# Patient Record
Sex: FEMALE
[De-identification: names, ages, dates, MRNs, and addresses within clinical notes are randomized per-mention and may not be internally consistent; named-entity substitution may affect disease eponyms.]

---

## 2017-08-28 ENCOUNTER — RX ONLY (RX ONLY)
Age: 22
End: 2017-08-28

## 2017-08-28 ENCOUNTER — PSORIASIS (OUTPATIENT)
Dept: URBAN - METROPOLITAN AREA CLINIC 31 | Facility: CLINIC | Age: 22
Setting detail: DERMATOLOGY
End: 2017-08-28

## 2017-08-28 PROBLEM — L40.0 PSORIASIS VULGARIS: Status: RESOLVED | Noted: 2017-08-28

## 2017-08-28 PROBLEM — L71.8 OTHER ROSACEA: Status: RESOLVED | Noted: 2017-08-28

## 2017-08-28 PROBLEM — L71.8 OTHER ROSACEA: Status: ACTIVE | Noted: 2017-08-28

## 2017-08-28 PROBLEM — L40.0 PSORIASIS VULGARIS: Status: ACTIVE | Noted: 2017-08-28

## 2017-08-28 PROCEDURE — 99202 OFFICE O/P NEW SF 15 MIN: CPT

## 2017-08-28 RX ORDER — CLOBETASOL PROPIONATE 0.5 MG/G
1 APPLICATION CREAM TOPICAL BID
Qty: 60 | Refills: 1
Start: 2017-08-28

## 2017-08-28 RX ORDER — KETOCONAZOLE 20 MG/ML
1 APPLICATION SHAMPOO, SUSPENSION TOPICAL 3 TIMES WEEKLY
Qty: 120 | Refills: 4 | Status: DISCONTINUED
Start: 2017-08-28 | End: 2018-12-27

## 2017-08-28 RX ORDER — CLOBETASOL PROPIONATE 0.5 MG/ML
1 ML SPRAY TOPICAL NIGHTLY
Qty: 125 | Refills: 3
Start: 2017-08-28

## 2017-11-21 ENCOUNTER — VBEAM/KTP-BH (OUTPATIENT)
Dept: URBAN - METROPOLITAN AREA CLINIC 31 | Facility: CLINIC | Age: 22
Setting detail: DERMATOLOGY
End: 2017-11-21

## 2017-11-21 PROCEDURE — VBEAM VBEAM LASER TREATMENT: HCPCS

## 2017-11-29 ENCOUNTER — FOLLOW UP (OUTPATIENT)
Dept: URBAN - METROPOLITAN AREA CLINIC 34 | Facility: CLINIC | Age: 22
Setting detail: DERMATOLOGY
End: 2017-11-29

## 2017-11-29 PROBLEM — L71.8 OTHER ROSACEA: Status: RESOLVED | Noted: 2017-11-29

## 2017-11-29 PROBLEM — L40.0 PSORIASIS VULGARIS: Status: RESOLVED | Noted: 2017-11-29

## 2017-11-29 PROBLEM — L40.0 PSORIASIS VULGARIS: Status: ACTIVE | Noted: 2017-11-29

## 2017-11-29 PROBLEM — L71.8 OTHER ROSACEA: Status: ACTIVE | Noted: 2017-11-29

## 2017-11-29 PROCEDURE — 99213 OFFICE O/P EST LOW 20 MIN: CPT

## 2018-12-27 ENCOUNTER — RX ONLY (RX ONLY)
Age: 23
End: 2018-12-27

## 2018-12-27 ENCOUNTER — ACNE/ROSACEA (OUTPATIENT)
Dept: URBAN - METROPOLITAN AREA CLINIC 32 | Facility: CLINIC | Age: 23
Setting detail: DERMATOLOGY
End: 2018-12-27

## 2018-12-27 PROBLEM — L40.0 PSORIASIS VULGARIS: Status: RESOLVED | Noted: 2018-12-27

## 2018-12-27 PROBLEM — L40.0 PSORIASIS VULGARIS: Status: ACTIVE | Noted: 2018-12-27

## 2018-12-27 PROCEDURE — 99213 OFFICE O/P EST LOW 20 MIN: CPT

## 2018-12-27 RX ORDER — CLINDAMYCIN PHOSPHATE 10 MG/G
1 APPLICATION GEL TOPICAL DAILY
Qty: 60 | Refills: 3
Start: 2018-12-27

## 2018-12-27 RX ORDER — DOXYCYCLINE 100 MG/1
1 TABLET TABLET, FILM COATED ORAL BID
Qty: 60 | Refills: 1
Start: 2018-12-27

## 2018-12-27 RX ORDER — FLUOCINONIDE 0.5 MG/ML
1 APPLICATION SOLUTION TOPICAL NIGHTLY
Qty: 60 | Refills: 3
Start: 2018-12-27

## 2018-12-27 RX ORDER — KETOCONAZOLE 20 MG/ML
1 APPLICATION SHAMPOO, SUSPENSION TOPICAL 3 TIMES WEEKLY
Qty: 120 | Refills: 4
Start: 2018-12-27

## 2018-12-28 ENCOUNTER — VBEAM/KTP-BH (OUTPATIENT)
Dept: URBAN - METROPOLITAN AREA CLINIC 31 | Facility: CLINIC | Age: 23
Setting detail: DERMATOLOGY
End: 2018-12-28

## 2018-12-28 PROCEDURE — VBEAM VBEAM LASER TREATMENT: HCPCS

## 2019-01-09 ENCOUNTER — RX ONLY (RX ONLY)
Age: 24
End: 2019-01-09

## 2019-01-09 RX ORDER — MUPIROCIN 20 MG/G
1 APPLICATION OINTMENT TOPICAL ADD'L SIG
Qty: 15 | Refills: 1
Start: 2019-01-09

## 2019-11-22 ENCOUNTER — VBEAM/KTP-BH (OUTPATIENT)
Dept: URBAN - METROPOLITAN AREA CLINIC 31 | Facility: CLINIC | Age: 24
Setting detail: DERMATOLOGY
End: 2019-11-22

## 2019-11-22 DIAGNOSIS — D22.5 MELANOCYTIC NEVI OF TRUNK: ICD-10-CM

## 2019-11-22 PROCEDURE — VBEAM VBEAM LASER TREATMENT: HCPCS

## 2019-12-20 ENCOUNTER — SKIN CANCER EXAM (OUTPATIENT)
Dept: URBAN - METROPOLITAN AREA CLINIC 31 | Facility: CLINIC | Age: 24
Setting detail: DERMATOLOGY
End: 2019-12-20

## 2019-12-20 DIAGNOSIS — L73.8 OTHER SPECIFIED FOLLICULAR DISORDERS: ICD-10-CM

## 2019-12-20 DIAGNOSIS — L30.9 DERMATITIS, UNSPECIFIED: ICD-10-CM

## 2019-12-20 DIAGNOSIS — D23.62 OTHER BENIGN NEOPLASM OF SKIN OF LEFT UPPER LIMB, INCLUDING SHOULDER: ICD-10-CM

## 2019-12-20 PROCEDURE — 99214 OFFICE O/P EST MOD 30 MIN: CPT

## 2021-11-29 ENCOUNTER — VBEAM/KTP-EC (OUTPATIENT)
Dept: URBAN - METROPOLITAN AREA CLINIC 32 | Facility: CLINIC | Age: 26
Setting detail: DERMATOLOGY
End: 2021-11-29

## 2021-11-29 DIAGNOSIS — L66.9 CICATRICIAL ALOPECIA, UNSPECIFIED: ICD-10-CM

## 2021-11-29 PROCEDURE — VBEAM VBEAM LASER TREATMENT: HCPCS

## 2024-12-19 ENCOUNTER — APPOINTMENT (OUTPATIENT)
Dept: URBAN - METROPOLITAN AREA CLINIC 208 | Age: 29
Setting detail: DERMATOLOGY
End: 2024-12-26

## 2024-12-19 DIAGNOSIS — Z41.9 ENCOUNTER FOR PROCEDURE FOR PURPOSES OTHER THAN REMEDYING HEALTH STATE, UNSPECIFIED: ICD-10-CM

## 2024-12-19 PROCEDURE — OTHER VBEAM PERFECTA LASER: OTHER

## 2024-12-19 ASSESSMENT — LOCATION SIMPLE DESCRIPTION DERM
LOCATION SIMPLE: NOSE
LOCATION SIMPLE: LEFT CHEEK
LOCATION SIMPLE: INFERIOR FOREHEAD
LOCATION SIMPLE: RIGHT CHEEK

## 2024-12-19 ASSESSMENT — LOCATION DETAILED DESCRIPTION DERM
LOCATION DETAILED: NASAL DORSUM
LOCATION DETAILED: INFERIOR MID FOREHEAD
LOCATION DETAILED: RIGHT INFERIOR CENTRAL MALAR CHEEK
LOCATION DETAILED: LEFT INFERIOR MEDIAL MALAR CHEEK

## 2024-12-19 ASSESSMENT — LOCATION ZONE DERM
LOCATION ZONE: NOSE
LOCATION ZONE: FACE

## 2024-12-19 NOTE — PROCEDURE: VBEAM PERFECTA LASER
Spray Time (Ms): 0
Spot Size: 5 mm
Spray Time (Ms): 30
Pulse Duration: 10 ms
Is There A Third Setting?: no
Post-Procedure: Following the procedure the post care instructions were reviewed with the patient.
Location: cheeks, forehead and nose
Delay Time (Ms): 20
Treatment Number: 1
Consent: Written consent obtained.  Risks were reviewed including but not limited to crusting, scabbing, blistering, scarring, darker or lighter pigmentary change, bruising, and/or incomplete response.
Skin Type (Optional): II
Number Of Pulses: 300
Spot Size: 10 mm
Fluence (J/Cm2): 6.5
Detail Level: Detailed
Post-Care Instructions: I reviewed with the patient in detail post-care instructions.  Cool compresses or cold gel packs may be applied after treatment.  Exposure to the sun should be avoided and sun protection and sunscreen should be used.
Pre-Procedure: The treatment areas identified by the patient were cleansed and treatment was performed with the parameters mentioned above.